# Patient Record
Sex: FEMALE | Race: WHITE | NOT HISPANIC OR LATINO | Employment: UNEMPLOYED | URBAN - METROPOLITAN AREA
[De-identification: names, ages, dates, MRNs, and addresses within clinical notes are randomized per-mention and may not be internally consistent; named-entity substitution may affect disease eponyms.]

---

## 2018-04-02 ENCOUNTER — TRANSCRIBE ORDERS (OUTPATIENT)
Dept: ADMINISTRATIVE | Facility: HOSPITAL | Age: 48
End: 2018-04-02

## 2018-04-02 DIAGNOSIS — Z12.31 VISIT FOR SCREENING MAMMOGRAM: Primary | ICD-10-CM

## 2018-05-02 ENCOUNTER — HOSPITAL ENCOUNTER (OUTPATIENT)
Dept: MAMMOGRAPHY | Facility: HOSPITAL | Age: 48
Discharge: HOME | End: 2018-05-02
Attending: OBSTETRICS & GYNECOLOGY | Admitting: OBSTETRICS & GYNECOLOGY

## 2018-05-02 ENCOUNTER — APPOINTMENT (OUTPATIENT)
Dept: OTHER | Facility: HOSPITAL | Age: 48
End: 2018-05-02
Attending: OBSTETRICS & GYNECOLOGY

## 2018-05-02 DIAGNOSIS — Z12.31 VISIT FOR SCREENING MAMMOGRAM: ICD-10-CM

## 2018-05-02 PROCEDURE — 77063 BREAST TOMOSYNTHESIS BI: CPT | Performed by: RADIOLOGY

## 2018-05-02 PROCEDURE — 77063 BREAST TOMOSYNTHESIS BI: CPT

## 2018-05-02 PROCEDURE — 77067 SCR MAMMO BI INCL CAD: CPT | Performed by: RADIOLOGY

## 2018-05-02 PROCEDURE — 77067 SCR MAMMO BI INCL CAD: CPT

## 2019-04-09 ENCOUNTER — TRANSCRIBE ORDERS (OUTPATIENT)
Dept: ADMINISTRATIVE | Facility: HOSPITAL | Age: 49
End: 2019-04-09

## 2019-04-09 DIAGNOSIS — Z12.31 VISIT FOR SCREENING MAMMOGRAM: Primary | ICD-10-CM

## 2019-05-08 ENCOUNTER — HOSPITAL ENCOUNTER (OUTPATIENT)
Dept: MAMMOGRAPHY | Facility: HOSPITAL | Age: 49
End: 2019-05-08

## 2019-06-17 ENCOUNTER — HOSPITAL ENCOUNTER (OUTPATIENT)
Dept: MAMMOGRAPHY | Facility: HOSPITAL | Age: 49
Discharge: HOME | End: 2019-06-17
Admitting: OBSTETRICS & GYNECOLOGY

## 2019-06-17 DIAGNOSIS — Z12.31 VISIT FOR SCREENING MAMMOGRAM: ICD-10-CM

## 2019-06-17 PROCEDURE — 77067 SCR MAMMO BI INCL CAD: CPT

## 2019-06-17 PROCEDURE — 77067 SCR MAMMO BI INCL CAD: CPT | Performed by: RADIOLOGY

## 2019-06-17 PROCEDURE — 77063 BREAST TOMOSYNTHESIS BI: CPT

## 2019-06-17 PROCEDURE — 77063 BREAST TOMOSYNTHESIS BI: CPT | Performed by: RADIOLOGY

## 2019-06-28 ENCOUNTER — HOSPITAL ENCOUNTER (OUTPATIENT)
Dept: ULTRASOUND IMAGING | Facility: HOSPITAL | Age: 49
Discharge: HOME | End: 2019-06-28

## 2019-06-28 ENCOUNTER — HOSPITAL ENCOUNTER (OUTPATIENT)
Dept: MAMMOGRAPHY | Facility: HOSPITAL | Age: 49
Discharge: HOME | End: 2019-06-28
Admitting: RADIOLOGY

## 2019-06-28 DIAGNOSIS — R92.8 ABNORMAL MAMMOGRAM: ICD-10-CM

## 2019-06-28 PROCEDURE — 77066 DX MAMMO INCL CAD BI: CPT | Performed by: RADIOLOGY

## 2019-06-28 PROCEDURE — 77062 BREAST TOMOSYNTHESIS BI: CPT | Performed by: RADIOLOGY

## 2019-06-28 PROCEDURE — 76642 ULTRASOUND BREAST LIMITED: CPT

## 2019-06-28 PROCEDURE — 76642 ULTRASOUND BREAST LIMITED: CPT | Performed by: RADIOLOGY

## 2019-06-28 PROCEDURE — G0279 TOMOSYNTHESIS, MAMMO: HCPCS

## 2019-06-28 PROCEDURE — 77066 DX MAMMO INCL CAD BI: CPT

## 2020-05-20 ENCOUNTER — TRANSCRIBE ORDERS (OUTPATIENT)
Dept: ADMINISTRATIVE | Facility: HOSPITAL | Age: 50
End: 2020-05-20

## 2020-05-20 DIAGNOSIS — Z12.31 VISIT FOR SCREENING MAMMOGRAM: Primary | ICD-10-CM

## 2020-07-09 ENCOUNTER — HOSPITAL ENCOUNTER (OUTPATIENT)
Dept: MAMMOGRAPHY | Facility: HOSPITAL | Age: 50
Discharge: HOME | End: 2020-07-09
Admitting: OBSTETRICS & GYNECOLOGY

## 2020-07-09 DIAGNOSIS — Z12.31 VISIT FOR SCREENING MAMMOGRAM: ICD-10-CM

## 2020-07-09 PROCEDURE — 77063 BREAST TOMOSYNTHESIS BI: CPT | Performed by: RADIOLOGY

## 2020-07-09 PROCEDURE — 77067 SCR MAMMO BI INCL CAD: CPT | Performed by: RADIOLOGY

## 2020-07-09 PROCEDURE — 77063 BREAST TOMOSYNTHESIS BI: CPT

## 2020-07-09 PROCEDURE — 77067 SCR MAMMO BI INCL CAD: CPT

## 2020-09-03 RX ORDER — TRANEXAMIC ACID 650 MG/1
1 TABLET ORAL 3 TIMES DAILY
Qty: 30 TABLET | Refills: 1 | Status: SHIPPED | OUTPATIENT
Start: 2020-09-03 | End: 2020-10-03

## 2021-05-24 ENCOUNTER — OFFICE VISIT (OUTPATIENT)
Dept: OBSTETRICS AND GYNECOLOGY | Facility: CLINIC | Age: 51
End: 2021-05-24

## 2021-05-24 VITALS
SYSTOLIC BLOOD PRESSURE: 118 MMHG | WEIGHT: 155.2 LBS | HEIGHT: 64 IN | DIASTOLIC BLOOD PRESSURE: 80 MMHG | BODY MASS INDEX: 26.5 KG/M2

## 2021-05-24 DIAGNOSIS — Z30.011 ENCOUNTER FOR ORAL CONTRACEPTION INITIAL PRESCRIPTION: ICD-10-CM

## 2021-05-24 DIAGNOSIS — D25.9 UTERINE LEIOMYOMA, UNSPECIFIED LOCATION: ICD-10-CM

## 2021-05-24 DIAGNOSIS — M54.50 LOW BACK PAIN, UNSPECIFIED BACK PAIN LATERALITY, UNSPECIFIED CHRONICITY, UNSPECIFIED WHETHER SCIATICA PRESENT: Primary | ICD-10-CM

## 2021-05-24 DIAGNOSIS — N94.6 DYSMENORRHEA: ICD-10-CM

## 2021-05-24 LAB
BILIRUB BLD-MCNC: NEGATIVE MG/DL
CLARITY, POC: CLEAR
COLOR UR: YELLOW
GLUCOSE UR STRIP-MCNC: NEGATIVE MG/DL
KETONES UR QL: NEGATIVE
LEUKOCYTE EST, POC: NEGATIVE
NITRITE UR-MCNC: NEGATIVE MG/ML
PH UR: 6.5 [PH] (ref 5–8)
PROT UR STRIP-MCNC: NEGATIVE MG/DL
RBC # UR STRIP: ABNORMAL /UL
SP GR UR: 1.01 (ref 1–1.03)
UROBILINOGEN UR QL: NORMAL

## 2021-05-24 PROCEDURE — 81003 URINALYSIS AUTO W/O SCOPE: CPT | Performed by: OBSTETRICS & GYNECOLOGY

## 2021-05-24 PROCEDURE — 99203 OFFICE O/P NEW LOW 30 MIN: CPT | Performed by: OBSTETRICS & GYNECOLOGY

## 2021-05-24 RX ORDER — HYDROCHLOROTHIAZIDE 25 MG/1
25 TABLET ORAL DAILY
COMMUNITY
Start: 2021-04-01

## 2021-05-24 RX ORDER — TRANEXAMIC ACID 650 MG/1
TABLET ORAL
Qty: 30 TABLET | Refills: 12 | Status: SHIPPED | OUTPATIENT
Start: 2021-05-24 | End: 2021-05-24 | Stop reason: ALTCHOICE

## 2021-05-24 RX ORDER — NORETHINDRONE ACETATE AND ETHINYL ESTRADIOL, ETHINYL ESTRADIOL AND FERROUS FUMARATE 1MG-10(24)
1 KIT ORAL DAILY
Qty: 84 TABLET | Refills: 0 | Status: SHIPPED | OUTPATIENT
Start: 2021-05-24 | End: 2021-09-13

## 2021-05-24 RX ORDER — DICYCLOMINE HCL 20 MG
TABLET ORAL
COMMUNITY
Start: 2021-05-20 | End: 2021-09-13

## 2021-05-24 NOTE — PROGRESS NOTES
Chief Complaint   Patient presents with   • Follow-up     painful period       Subjective   HPI  Aminta Mena is a 51 y.o. female, , who presents for very painful periods.      She states she has experienced this problem for 6 months.  She describes the severity as moderate and severe.  She states that the problem is worsening.  The patient reports additional symptoms as bloating, heavy bleeding. The patient uses 1 of tampons/pads per hour., intermenstrual bleeding and pelvic pain.      Her last LMP was Patient's last menstrual period was 2021..  Periods are regular every 21-30 days, lasting 4-5 days.  Dysmenorrhea:severe, occurring throughout menses.  Patient reports problems with: bloating, heavy bleeding. The patient uses 1 of tampons/pads per hour., intermenstrual bleeding and pelvic pain.  Partner Status: Marital Status: .  New Partners since last visit: no.  Desires STD Screening: no.    Patient reports she began having the bad periods about 6-7 months ago, she states she began having intermenstrual bleeding about correction through her cycle. This is spotting and it lasts one day.  This correlates with ovulation.  She states last night and today is the worst the pain has been and she states it is all across the front and the right side of her lower back. She asked to do a urinalysis to check for UTI.  She takes 400 mg motrin Q3 hours and this doesn't touch the pain.    Additional OB/GYN History   Current contraception: contraceptive methods: Vasectomy   Desires to: do not start contraception  Last Pap :   Last Completed Pap Smear     This patient has no relevant Health Maintenance data.        History of abnormal Pap smear: no  Last mammogram:   Last Completed Mammogram          MAMMOGRAM (Every 2 Years) Next due on 2020  Mammo Screening Digital Tomosynthesis Bilateral With CAD    2019  Mammo Diagnostic Digital Tomosynthesis Bilateral With CAD    2019   "Mammo Screening Digital Tomosynthesis Bilateral With CAD    2018  Mammo Screening Digital Tomosynthesis Bilateral With CAD              Tobacco Usage?: No   OB History        2    Para   2    Term   2            AB        Living   2       SAB        TAB        Ectopic        Molar        Multiple        Live Births   2                Health Maintenance   Topic Date Due   • Annual Gynecologic Pelvic and Breast Exam  Never done   • COLORECTAL CANCER SCREENING  Never done   • ANNUAL PHYSICAL  Never done   • COVID-19 Vaccine (1) Never done   • TDAP/TD VACCINES (1 - Tdap) Never done   • HEPATITIS C SCREENING  Never done   • ZOSTER VACCINE (1 of 2) Never done   • PAP SMEAR  2020   • INFLUENZA VACCINE  2021   • MAMMOGRAM  2022   • Pneumococcal Vaccine 0-64  Aged Out       The additional following portions of the patient's history were reviewed and updated as appropriate: allergies, current medications, past family history, past medical history, past social history and past surgical history.    Review of Systems   Constitutional: Negative.    HENT: Negative.    Eyes: Negative.    Respiratory: Negative.    Cardiovascular: Negative.    Gastrointestinal: Negative.    Endocrine: Negative.    Genitourinary: Positive for menstrual problem (intermenstrual bleeding) and pelvic pain.        Bloating   Musculoskeletal: Negative.    Skin: Negative.    Allergic/Immunologic: Negative.    Neurological: Negative.    Hematological: Negative.    Psychiatric/Behavioral: Negative.        I have reviewed and agree with the HPI, ROS, and historical information as entered above. Joelle Briscoe MD    Objective   /80   Ht 162.6 cm (64\")   Wt 70.4 kg (155 lb 3.2 oz)   LMP 2021   BMI 26.64 kg/m²     Physical Exam  Vitals and nursing note reviewed.   Constitutional:       General: She is not in acute distress.     Appearance: Normal appearance.   HENT:      Head: Normocephalic and atraumatic. "   Pulmonary:      Effort: Pulmonary effort is normal. No accessory muscle usage or respiratory distress.   Neurological:      Mental Status: She is alert and oriented to person, place, and time.   Psychiatric:         Mood and Affect: Mood and affect normal.         Speech: Speech normal.         Assessment/Plan     Assessment     Problem List Items Addressed This Visit        Genitourinary and Reproductive     Uterine fibroid      Other Visit Diagnoses     Low back pain, unspecified back pain laterality, unspecified chronicity, unspecified whether sciatica present    -  Primary    Relevant Orders    POC Urinalysis Dipstick, Automated (Completed)    Urine Culture - Urine, Urine, Clean Catch    Dysmenorrhea                Plan     Return for Annual physical.  1. The patient was counseled on risks of OCPs including increased risks for thromboembolic disease including dvt, pulmonary embolus, stroke and death.  We reviewed theoretical risks for breast cancer.  We also discussed risks for hypertension.  We reviewed that the risks are increased due to age being 35 or older and she desires to continue her OCPs.  The patient confirms she is a nonsmoker.  2. No lysteda while on OCPs  3. Will follow up for annual exam and to assess response of OCPs  4. If not improved will proceed with u/s.      Joelle Briscoe MD  05/24/2021

## 2021-05-25 RX ORDER — TRANEXAMIC ACID 650 MG/1
1 TABLET ORAL 3 TIMES DAILY
Qty: 30 TABLET | Refills: 1 | OUTPATIENT
Start: 2021-05-25 | End: 2021-06-24

## 2021-05-27 LAB
BACTERIA UR CULT: ABNORMAL
BACTERIA UR CULT: ABNORMAL
OTHER ANTIBIOTIC SUSC ISLT: ABNORMAL

## 2021-09-13 ENCOUNTER — OFFICE VISIT (OUTPATIENT)
Dept: OBSTETRICS AND GYNECOLOGY | Facility: CLINIC | Age: 51
End: 2021-09-13

## 2021-09-13 VITALS
HEIGHT: 64 IN | DIASTOLIC BLOOD PRESSURE: 78 MMHG | SYSTOLIC BLOOD PRESSURE: 120 MMHG | BODY MASS INDEX: 25.2 KG/M2 | WEIGHT: 147.6 LBS

## 2021-09-13 DIAGNOSIS — D25.1 INTRAMURAL LEIOMYOMA OF UTERUS: ICD-10-CM

## 2021-09-13 DIAGNOSIS — Z12.31 BREAST CANCER SCREENING BY MAMMOGRAM: ICD-10-CM

## 2021-09-13 DIAGNOSIS — N92.0 MENORRHAGIA WITH REGULAR CYCLE: ICD-10-CM

## 2021-09-13 DIAGNOSIS — Z01.419 ENCOUNTER FOR GYNECOLOGICAL EXAMINATION: Primary | ICD-10-CM

## 2021-09-13 PROCEDURE — 99396 PREV VISIT EST AGE 40-64: CPT | Performed by: OBSTETRICS & GYNECOLOGY

## 2021-09-13 RX ORDER — HYOSCYAMINE SULFATE EXTENDED-RELEASE 0.38 MG/1
TABLET ORAL
COMMUNITY
Start: 2021-08-29 | End: 2022-07-12

## 2021-09-13 RX ORDER — TRANEXAMIC ACID 650 MG/1
TABLET ORAL
COMMUNITY
Start: 2021-07-18 | End: 2021-09-13 | Stop reason: SDUPTHER

## 2021-09-13 RX ORDER — TRANEXAMIC ACID 650 MG/1
TABLET ORAL
Qty: 30 TABLET | Refills: 11 | Status: SHIPPED | OUTPATIENT
Start: 2021-09-13 | End: 2022-11-21

## 2021-09-13 NOTE — PROGRESS NOTES
Gynecologic Annual Exam Note      CC- Here for annual exam    Subjective     Aminta Mena is a 51 y.o. female established patient who presents for annual well woman exam. Patient's last menstrual period was 2021.. Her periods are regular every 28-30 days, lasting 4-6 days and are heavy and clots are present.  She reports moderate dysmenorrhea.    Partner Status: Marital Status: . New Partners since last visit: no.  Desires STD Screening: no.   Current contraception: vasectomy  The patient is happy with her current contraceptive method.      The patient does not have any complaints today.    She exercises regularly: no.  She has concerns about domestic violence: no.      OB History        2    Para   2    Term   2            AB        Living   2       SAB        TAB        Ectopic        Molar        Multiple        Live Births   2              Performs monthly Self-Breast Exam: no  History of abnormal Pap smear: no  Family history of uterine, colon or ovarian cancer: no  Family history of breast cancer: no  History of abnormal mammogram: no  Exercises Regularly: no  Feelings of Anxiety or Depression: no  Tobacco Usage?: No   Thromboembolic Disease: none      Last Mammogram:  Last Completed Mammogram          Ordered - MAMMOGRAM (Every 2 Years) Ordered on 2020  Mammo Screening Digital Tomosynthesis Bilateral With CAD    2019  Mammo Diagnostic Digital Tomosynthesis Bilateral With CAD    2019  Mammo Screening Digital Tomosynthesis Bilateral With CAD    2018  Mammo Screening Digital Tomosynthesis Bilateral With CAD                Last Colonoscopy: never  Pt states she did the cologuard last year and it was normal.      The following portions of the patient's history were reviewed and updated as appropriate: allergies, current medications, past family history, past medical history, past social history, past surgical history and problem  "list.    Past Medical History:   Diagnosis Date   • Anxiety    • Cough    • Depression    • Fatigue    • Hypertension    • Insomnia    • Irritable bowel syndrome    • Migraine headache     aura, vision changes   • Palpitations    • Pharyngitis    • Upper respiratory infection        Past Surgical History:   Procedure Laterality Date   • ABDOMINOPLASTY  2008   • CHOLECYSTECTOMY  05/2011   • KIDNEY STONE SURGERY  2009       Review of Systems  Review of Systems    Objective     /78 (BP Location: Left arm, Patient Position: Sitting, Cuff Size: Adult)   Ht 162.6 cm (64\")   Wt 67 kg (147 lb 9.6 oz)   LMP 08/23/2021   Breastfeeding No   BMI 25.34 kg/m²       Physical Exam  Vitals and nursing note reviewed. Exam conducted with a chaperone present.   Constitutional:       General: She is awake.   HENT:      Head: Normocephalic and atraumatic.   Neck:      Thyroid: No thyroid mass, thyromegaly or thyroid tenderness.   Cardiovascular:      Rate and Rhythm: Normal rate.      Heart sounds: No murmur heard.   No friction rub. No gallop.    Pulmonary:      Effort: Pulmonary effort is normal.      Breath sounds: Normal breath sounds. No stridor. No wheezing, rhonchi or rales.   Chest:      Chest wall: No mass or tenderness.      Breasts:         Right: Normal. No bleeding, inverted nipple, mass, nipple discharge, skin change or tenderness.         Left: Normal. No bleeding, inverted nipple, mass, nipple discharge, skin change or tenderness.   Abdominal:      Palpations: Abdomen is soft.      Tenderness: There is no guarding or rebound.      Hernia: There is no hernia in the left inguinal area or right inguinal area.   Genitourinary:     General: Normal vulva.      Pubic Area: No rash.       Labia:         Right: No rash, tenderness, lesion or injury.         Left: No rash, tenderness, lesion or injury.       Urethra: No prolapse, urethral swelling or urethral lesion.      Vagina: No foreign body. No vaginal discharge, " erythema, tenderness, bleeding or lesions.      Cervix: No cervical motion tenderness, discharge, friability, lesion, erythema or cervical bleeding.      Uterus: Normal. Not deviated, not enlarged, not fixed and not tender.       Adnexa: Right adnexa normal and left adnexa normal.        Right: No mass, tenderness or fullness.          Left: No mass, tenderness or fullness.        Rectum: No external hemorrhoid.   Musculoskeletal:      Cervical back: Normal range of motion.   Lymphadenopathy:      Upper Body:      Right upper body: No supraclavicular or axillary adenopathy.      Left upper body: No supraclavicular or axillary adenopathy.   Skin:     General: Skin is warm.   Neurological:      Mental Status: She is alert and oriented to person, place, and time.   Psychiatric:         Mood and Affect: Mood and affect normal.         Speech: Speech normal.         Assessment     Assessment     Problem List Items Addressed This Visit        Genitourinary and Reproductive     Uterine fibroid    Relevant Orders    US Non-ob Transvaginal      Other Visit Diagnoses     Encounter for gynecological examination    -  Primary    Relevant Orders    Pap IG, HPV-hr    Breast cancer screening by mammogram        Relevant Orders    Mammo Screening Digital Tomosynthesis Bilateral With CAD    Menorrhagia with regular cycle        Relevant Medications    Tranexamic Acid 650 MG tablet    Other Relevant Orders    US Non-ob Transvaginal            Plan     1. Reviewed monthly self breast exams.  Instructed to call with lumps, pain, or breast discharge.    2. Ordered Mammogram today  3. RTC in 1 year or PRN with problems.  4. Patient still with regular cycles that are heavy.  The Lysteda does help.  Repeat ultrasound performed today shows a normal sized uterus with a single fibroid measuring 1.3 x 1.1 x 1.0.  This is in the fundal location.  Bilateral ovaries were normal and there was no free fluid.  We discussed that she may continue the  Lysteda or begin an alternative therapy including progesterone only pills or Mirena IUD.  The IUD was discussed in detail.  She will consider these and if things are worsening she may return to the office for this.  5. The patient was counseled on types of intrauterine devices, insertion, and the typical bleeding patterns of these.  She understands that she may have spotting for 2-3 months after insertion and then may or may not have periods.  Occasionally, people may have more prolonged spotting.  Most people will have significantly shorter lighter periods with Kyleena or Mirena IUDs.  With Paraguard IUDs, periods may be heavier and cramping may become more intense.       Joelle Briscoe MD  09/13/2021

## 2021-09-13 NOTE — PATIENT INSTRUCTIONS
Breast Self-Awareness  Breast self-awareness is knowing how your breasts look and feel. Doing breast self-awareness is important. It allows you to catch a breast problem early while it is still small and can be treated. All women should do breast self-awareness, including women who have had breast implants. Tell your doctor if you notice a change in your breasts.  What you need:  · A mirror.  · A well-lit room.  How to do a breast self-exam  A breast self-exam is one way to learn what is normal for your breasts and to check for changes. To do a breast self-exam:  Look for changes    1. Take off all the clothes above your waist.  2.  front of a mirror in a room with good lighting.  3. Put your hands on your hips.  4. Push your hands down.  5. Look at your breasts and nipples in the mirror to see if one breast or nipple looks different from the other. Check to see if:  ? The shape of one breast is different.  ? The size of one breast is different.  ? There are wrinkles, dips, and bumps in one breast and not the other.  6. Look at each breast for changes in the skin, such as:  ? Redness.  ? Scaly areas.  7. Look for changes in your nipples, such as:  ? Liquid around the nipples.  ? Bleeding.  ? Dimpling.  ? Redness.  ? A change in where the nipples are.    Feel for changes    1. Lie on your back on the floor.  2. Feel each breast. To do this, follow these steps:  ? Pick a breast to feel.  ? Put the arm closest to that breast above your head.  ? Use your other arm to feel the nipple area of your breast. Feel the area with the pads of your three middle fingers by making small circles with your fingers. For the first [place], press lightly. For the second [place], press harder. For the third [place], press even harder.  ? Keep making circles with your fingers at the different pressures as you move down your breast. Stop when you feel your ribs.  ? Move your fingers a little toward the center of your body.  ? Start  making circles with your fingers again, this time going up until you reach your collarbone.  ? Keep making up-and-down circles until you reach your armpit. Remember to keep using the three pressures.  ? Feel the other breast in the same way.  3. Sit or  the tub or shower.  4. With soapy water on your skin, feel each breast the same way you did in step 2 when you were lying on the floor.    Write down what you find  Writing down what you find can help you remember what to tell your doctor. Write down:  · What is normal for each breast.  · Any changes you find in each breast, including:  ? The kind of changes you find.  ? Whether you have pain.  ? Size and location of any lumps.  · When you last had your menstrual period.  General tips  · Check your breasts every month.  · If you are breastfeeding, the best time to check your breasts is after you feed your baby or after you use a breast pump.  · If you get menstrual periods, the best time to check your breasts is 5-7 days after your menstrual period is over.  · With time, you will become comfortable with the self-exam, and you will begin to know if there are changes in your breasts.  Contact a doctor if you:  · See a change in the shape or size of your breasts or nipples.  · See a change in the skin of your breast or nipples, such as red or scaly skin.  · Have fluid coming from your nipples that is not normal.  · Find a lump or thick area that was not there before.  · Have pain in your breasts.  · Have any concerns about your breast health.  Summary  · Breast self-awareness includes looking for changes in your breasts, as well as feeling for changes within your breasts.  · Breast self-awareness should be done in front of a mirror in a well-lit room.  · You should check your breasts every month. If you get menstrual periods, the best time to check your breasts is 5-7 days after your menstrual period is over.  · Let your doctor know of any changes you see in  your breasts, including changes in size, changes on the skin, pain or tenderness, or fluid from your nipples that is not normal.  This information is not intended to replace advice given to you by your health care provider. Make sure you discuss any questions you have with your health care provider.  Document Revised: 08/06/2019 Document Reviewed: 08/06/2019  [employer] Patient Education © 2021 [employer]

## 2022-07-07 ENCOUNTER — HOSPITAL ENCOUNTER (OUTPATIENT)
Dept: MAMMOGRAPHY | Facility: HOSPITAL | Age: 52
Discharge: HOME | End: 2022-07-07
Admitting: OBSTETRICS & GYNECOLOGY

## 2022-07-07 DIAGNOSIS — Z12.31 BREAST CANCER SCREENING BY MAMMOGRAM: ICD-10-CM

## 2022-07-07 PROCEDURE — 77063 BREAST TOMOSYNTHESIS BI: CPT | Performed by: RADIOLOGY

## 2022-07-07 PROCEDURE — 77063 BREAST TOMOSYNTHESIS BI: CPT

## 2022-07-07 PROCEDURE — 77067 SCR MAMMO BI INCL CAD: CPT | Performed by: RADIOLOGY

## 2022-07-07 PROCEDURE — 77067 SCR MAMMO BI INCL CAD: CPT

## 2022-07-12 ENCOUNTER — OFFICE VISIT (OUTPATIENT)
Dept: OBSTETRICS AND GYNECOLOGY | Facility: CLINIC | Age: 52
End: 2022-07-12

## 2022-07-12 VITALS
HEIGHT: 64 IN | BODY MASS INDEX: 23.42 KG/M2 | SYSTOLIC BLOOD PRESSURE: 126 MMHG | WEIGHT: 137.2 LBS | DIASTOLIC BLOOD PRESSURE: 80 MMHG

## 2022-07-12 DIAGNOSIS — N95.1 PERIMENOPAUSAL SYMPTOMS: ICD-10-CM

## 2022-07-12 DIAGNOSIS — N92.6 IRREGULAR MENSES: Primary | ICD-10-CM

## 2022-07-12 PROCEDURE — 99212 OFFICE O/P EST SF 10 MIN: CPT | Performed by: NURSE PRACTITIONER

## 2022-07-12 RX ORDER — ACETAMINOPHEN AND CODEINE PHOSPHATE 120; 12 MG/5ML; MG/5ML
1 SOLUTION ORAL DAILY
Qty: 28 TABLET | Refills: 12 | Status: SHIPPED | OUTPATIENT
Start: 2022-07-12 | End: 2022-11-21

## 2022-07-12 NOTE — PROGRESS NOTES
Gynecologic Exam Note      Chief Complaint   Patient presents with   • perimenopausal symptoms       Subjective   HPI  Aminta Mena is a 52 y.o. female, , who presents for perimenopausal symptoms.  She has had recent lab work with PCP and she states potassium was low and she was started on potassium supplements. Periods are as follows since 2022. Had normal period  x 7 days, then Feb light spotting only for a couple of days. , spotting only, 22 has regular period x 7 days,  normal period x 5 days, then  and bleeding x 5 days and  spotting and cramping only. She is here to discuss perimenopause symptoms. She recently started black cohash OTC and Ambren for hot flashes and night sweats over the counter.     She states she has experienced this problem for 7 months. She states that the problem is worsening.  The patient reports additional symptoms as headaches, nausea, insomnia, hot flashes, moodiness, little appetite and irregular periods.      Patient does take Lysteda occasionally. Patient reports that it does help minimize the amount of bleeding, but doesn't help with her cramping.     Her last LMP was No LMP recorded. Patient is perimenopausal.  Periods are irregular, lasting 5-6 days.  Dysmenorrhea:severe, occurring first 1-2 days of flow.  Patient reports problems with: none.  Partner Status: Marital Status: .  New Partners since last visit: no.  Desires STD Screening: no.    Additional OB/GYN History   Current contraception: contraceptive methods: Vasectomy   Desires to: do not start contraception - unless she needs to.   Last Pap :   Last Completed Pap Smear          PAP SMEAR (Yearly) Next due on 2021  Pap IG, HPV-hr    2019  Done              History of abnormal Pap smear: no  Last mammogram:   Last Completed Mammogram          MAMMOGRAM (Every 2 Years) Next due on 2022  Mammo Screening Digital  "Tomosynthesis Bilateral With CAD    2020  Mammo Screening Digital Tomosynthesis Bilateral With CAD    2019  Mammo Diagnostic Digital Tomosynthesis Bilateral With CAD    2019  Mammo Screening Digital Tomosynthesis Bilateral With CAD    2018  Mammo Screening Digital Tomosynthesis Bilateral With CAD              Tobacco Usage?: No   OB History        2    Para   2    Term   2            AB        Living   2       SAB        IAB        Ectopic        Molar        Multiple        Live Births   2                Health Maintenance   Topic Date Due   • COLORECTAL CANCER SCREENING  Never done   • COVID-19 Vaccine (1) Never done   • ANNUAL PHYSICAL  Never done   • TDAP/TD VACCINES (1 - Tdap) Never done   • HEPATITIS C SCREENING  Never done   • ZOSTER VACCINE (1 of 2) Never done   • LIPID PANEL  2022   • Annual Gynecologic Pelvic and Breast Exam  2022   • PAP SMEAR  2022   • INFLUENZA VACCINE  10/01/2022   • MAMMOGRAM  2024   • Pneumococcal Vaccine 0-64  Aged Out       The additional following portions of the patient's history were reviewed and updated as appropriate: allergies and current medications.    Review of Systems   Constitutional: Negative.    Cardiovascular: Negative.    Gastrointestinal: Positive for nausea.   Endocrine: Positive for heat intolerance (hot flashes).   Genitourinary: Positive for menstrual problem (irregular menses).   Neurological: Positive for headache.   Psychiatric/Behavioral: Positive for sleep disturbance.       I have reviewed and agree with the HPI, ROS, and historical information as entered above. Shantelle Mayorga, CUBA    Objective   /80 (BP Location: Left arm, Patient Position: Sitting, Cuff Size: Adult)   Ht 162.6 cm (64\")   Wt 62.2 kg (137 lb 3.2 oz)   BMI 23.55 kg/m²     Physical Exam  Vitals and nursing note reviewed.   Constitutional:       Appearance: Normal appearance. She is normal weight.   Neurological:      " Mental Status: She is alert.         Assessment & Plan     Assessment     Problem List Items Addressed This Visit    None     Visit Diagnoses     Irregular menses    -  Primary    Relevant Medications    norethindrone (MICRONOR) 0.35 MG tablet    Perimenopausal symptoms                Plan     Perimenopausal symptoms reviewed. She is going to start progestin only pills when next period starts.   2.   Will keep track of periods for now      Shantelle Mayorga, CUBA  07/12/2022

## 2022-11-21 ENCOUNTER — OFFICE VISIT (OUTPATIENT)
Dept: OBSTETRICS AND GYNECOLOGY | Facility: CLINIC | Age: 52
End: 2022-11-21

## 2022-11-21 VITALS
DIASTOLIC BLOOD PRESSURE: 90 MMHG | HEIGHT: 64 IN | WEIGHT: 132.8 LBS | SYSTOLIC BLOOD PRESSURE: 134 MMHG | BODY MASS INDEX: 22.67 KG/M2

## 2022-11-21 DIAGNOSIS — N81.4 UTERINE PROLAPSE: ICD-10-CM

## 2022-11-21 DIAGNOSIS — Z12.31 BREAST CANCER SCREENING BY MAMMOGRAM: ICD-10-CM

## 2022-11-21 DIAGNOSIS — R87.610 PAP SMEAR ABNORMALITY OF CERVIX WITH ASCUS FAVORING BENIGN: ICD-10-CM

## 2022-11-21 DIAGNOSIS — Z01.419 ENCOUNTER FOR GYNECOLOGICAL EXAMINATION WITHOUT ABNORMAL FINDING: Primary | ICD-10-CM

## 2022-11-21 PROCEDURE — 99396 PREV VISIT EST AGE 40-64: CPT | Performed by: OBSTETRICS & GYNECOLOGY

## 2022-11-21 RX ORDER — POTASSIUM CHLORIDE 1500 MG/1
20 TABLET, FILM COATED, EXTENDED RELEASE ORAL 2 TIMES DAILY WITH MEALS
COMMUNITY
Start: 2022-10-10

## 2022-11-21 RX ORDER — ONDANSETRON HYDROCHLORIDE 8 MG/1
8 TABLET, FILM COATED ORAL EVERY 6 HOURS PRN
COMMUNITY
Start: 2022-10-24

## 2022-11-21 NOTE — PROGRESS NOTES
Gynecologic Annual Exam Note          CC - Here for annual exam.     Subjective     HPI  Aminta Mena is a 52 y.o. female, , who presents for annual well woman exam. She is perimenopausal. Patient's last menstrual period was 2022 (exact date). Periods are irregular. Patient reports she did have some spotting in September and some spotting today. Dysmenorrhea: occasional. Patient reports that when she does have cramping, it is pretty minimal. Patient started taking Black Cohosh and Amberen in July for hot flashes and night sweats. Patient reports taking the Black Cohash for about a month, and didn't tolerate it - frequent headaches. Patient reports taking the Ambren for about 3 months, and read that you shouldn't take it longer than that, so she stopped. Patient reports she has not had any hot flashes or night sweats since that time. Patient reports problems with: trouble sleeping. Patient reports she was put on Temazepam and Klonopin to help and reports neither of those worked for her.     Patient was placed on Micronor (POP) at her last appt in July and was told to start with her next period to help with periomenopausal symptoms. Patient reports she never started her period, so she never took it.     Partner Status: Marital Status: . New Partners since last visit: no.  The patient Denies vasomotor symptoms.     She exercises regularly: no.  She has concerns about domestic violence: no.      Additional OB/GYN History   Current contraception: contraceptive methods: Vasectomy   Desires to: do not start contraception  History of abnormal Pap smear: no  Family history of uterine, colon, breast, or ovarian cancer: no  Performs monthly Self-Breast Exam: no  Feelings of Anxiety or Depression: yes - Managed somewhat per patient. Patient lost her mother 2 months ago    Last Pap : 2021 ASCUS HPV Negative   Last Completed Pap Smear     This patient has no relevant Health Maintenance data.           Last mammogram: 2022 At [facility]   No findings suspicious for malignancy.   Last Completed Mammogram          Ordered - MAMMOGRAM (Every 2 Years) Ordered on 2022  Mammo Screening Digital Tomosynthesis Bilateral With CAD    2020  Mammo Screening Digital Tomosynthesis Bilateral With CAD    2019  Mammo Diagnostic Digital Tomosynthesis Bilateral With CAD    2019  Mammo Screening Digital Tomosynthesis Bilateral With CAD    2018  Mammo Screening Digital Tomosynthesis Bilateral With CAD                Last colonoscopy: Cologard ~ (Normal per patient.)   Last Completed Colonoscopy          COLORECTAL CANCER SCREENING (COLOGUARD - Every 3 Years) Next due on 2021  COLOGUARD (Done)                  Tobacco Usage?: No   OB History        2    Para   2    Term   2       0    AB   0    Living   2       SAB   0    IAB   0    Ectopic   0    Molar   0    Multiple   0    Live Births   2                  The additional following portions of the patient's history were reviewed and updated as appropriate: allergies, current medications, past family history, past medical history, past social history and past surgical history.    Past Medical History:   Diagnosis Date   • Anxiety    • Cough    • Depression    • Fatigue    • Fibroid    • Hyperlipidemia    • Hypertension    • Insomnia    • Irritable bowel syndrome    • Kidney stone    • Migraine headache     aura, vision changes   • Palpitations    • Pharyngitis    • PMS (premenstrual syndrome)    • Upper respiratory infection    • Urinary tract infection    • Varicella         Past Surgical History:   Procedure Laterality Date   • ABDOMINOPLASTY     • CHOLECYSTECTOMY  2011   • KIDNEY STONE SURGERY     • WISDOM TOOTH EXTRACTION          Review of Systems    I have reviewed and agree with the HPI, ROS, and historical information as entered above. Joelle Briscoe MD    Objective   /90  "(BP Location: Left arm, Patient Position: Sitting, Cuff Size: Adult)   Ht 162.6 cm (64\")   Wt 60.2 kg (132 lb 12.8 oz)   LMP 06/13/2022 (Exact Date)   BMI 22.80 kg/m²     Physical Exam  Vitals and nursing note reviewed. Exam conducted with a chaperone present.   Constitutional:       General: She is awake.   HENT:      Head: Normocephalic and atraumatic.   Neck:      Thyroid: No thyroid mass, thyromegaly or thyroid tenderness.   Cardiovascular:      Rate and Rhythm: Normal rate.      Heart sounds: No murmur heard.    No friction rub. No gallop.   Pulmonary:      Effort: Pulmonary effort is normal.      Breath sounds: Normal breath sounds. No stridor. No wheezing, rhonchi or rales.   Chest:      Chest wall: No mass or tenderness.   Breasts:     Right: Normal. No bleeding, inverted nipple, mass, nipple discharge, skin change or tenderness.      Left: Normal. No bleeding, inverted nipple, mass, nipple discharge, skin change or tenderness.   Abdominal:      Palpations: Abdomen is soft.      Tenderness: There is no guarding or rebound.      Hernia: There is no hernia in the left inguinal area or right inguinal area.   Genitourinary:     General: Normal vulva.      Pubic Area: No rash.       Labia:         Right: No rash, tenderness, lesion or injury.         Left: No rash, tenderness, lesion or injury.       Urethra: No prolapse, urethral swelling or urethral lesion.      Vagina: No foreign body. No vaginal discharge, erythema, tenderness, bleeding or lesions.      Cervix: No cervical motion tenderness, discharge, friability, lesion, erythema or cervical bleeding.      Uterus: Normal. With uterine prolapse (grade 1-2). Not deviated, not enlarged, not fixed and not tender.       Adnexa: Right adnexa normal and left adnexa normal.        Right: No mass, tenderness or fullness.          Left: No mass, tenderness or fullness.        Rectum: No external hemorrhoid.   Musculoskeletal:      Cervical back: Normal range of " motion.   Lymphadenopathy:      Upper Body:      Right upper body: No supraclavicular or axillary adenopathy.      Left upper body: No supraclavicular or axillary adenopathy.   Skin:     General: Skin is warm.   Neurological:      Mental Status: She is alert and oriented to person, place, and time.   Psychiatric:         Mood and Affect: Mood and affect normal.         Speech: Speech normal.         Assessment & Plan     Assessment     Problem List Items Addressed This Visit    None  Visit Diagnoses     Encounter for gynecological examination without abnormal finding    -  Primary    Pap smear abnormality of cervix with ASCUS favoring benign        Relevant Orders    LIQUID-BASED PAP SMEAR, [facility] ([facility])    Breast cancer screening by mammogram        Relevant Orders    Mammo Screening Digital Tomosynthesis Bilateral With CAD    Uterine prolapse              Plan     1. Reviewed monthly self breast exams.  Instructed to call with lumps, pain, or breast discharge.  Yearly mammograms ordered.  2. Ordered mammogram today.  3. Instructed on Kegal exercises and gave patient educational information.  4. RTC in 1 year or PRN with problems.  5. Patient starting to notice prolapse vaginally.  This is asymptomatic.  She does have some mild uterine prolapse.  We discussed treatment options and referral to physical therapy.  At this time as she is symptomatic, she declines referral.  She will call us back if her symptoms worsen.    Joelle Briscoe MD  11/21/2022

## 2022-11-23 LAB — REF LAB TEST METHOD: NORMAL

## 2022-11-28 DIAGNOSIS — N92.0 MENORRHAGIA WITH REGULAR CYCLE: ICD-10-CM

## 2022-11-28 DIAGNOSIS — Z30.011 ENCOUNTER FOR ORAL CONTRACEPTION INITIAL PRESCRIPTION: Primary | ICD-10-CM

## 2022-11-28 RX ORDER — ACETAMINOPHEN AND CODEINE PHOSPHATE 120; 12 MG/5ML; MG/5ML
1 SOLUTION ORAL DAILY
Qty: 28 TABLET | Refills: 12 | Status: SHIPPED | OUTPATIENT
Start: 2022-11-28 | End: 2023-11-28

## 2022-11-28 RX ORDER — TRANEXAMIC ACID 650 MG/1
TABLET ORAL
Qty: 30 TABLET | Refills: 12 | Status: SHIPPED | OUTPATIENT
Start: 2022-11-28

## 2023-12-22 ENCOUNTER — TELEPHONE (OUTPATIENT)
Dept: OBSTETRICS AND GYNECOLOGY | Age: 53
End: 2023-12-22

## 2024-04-11 ENCOUNTER — PATIENT ROUNDING (BHMG ONLY) (OUTPATIENT)
Dept: OBSTETRICS AND GYNECOLOGY | Age: 54
End: 2024-04-11
Payer: COMMERCIAL

## 2024-04-11 ENCOUNTER — OFFICE VISIT (OUTPATIENT)
Dept: OBSTETRICS AND GYNECOLOGY | Age: 54
End: 2024-04-11
Payer: COMMERCIAL

## 2024-04-11 VITALS
BODY MASS INDEX: 24.41 KG/M2 | SYSTOLIC BLOOD PRESSURE: 138 MMHG | HEIGHT: 64 IN | WEIGHT: 143 LBS | DIASTOLIC BLOOD PRESSURE: 84 MMHG

## 2024-04-11 DIAGNOSIS — N39.41 URGE INCONTINENCE: ICD-10-CM

## 2024-04-11 DIAGNOSIS — N81.10 CYSTOCELE WITH RECTOCELE: ICD-10-CM

## 2024-04-11 DIAGNOSIS — Z12.31 SCREENING MAMMOGRAM FOR BREAST CANCER: ICD-10-CM

## 2024-04-11 DIAGNOSIS — N39.3 STRESS INCONTINENCE OF URINE: ICD-10-CM

## 2024-04-11 DIAGNOSIS — N81.6 CYSTOCELE WITH RECTOCELE: ICD-10-CM

## 2024-04-11 DIAGNOSIS — Z11.51 SCREENING FOR HUMAN PAPILLOMAVIRUS (HPV): ICD-10-CM

## 2024-04-11 DIAGNOSIS — N81.3 UTEROVAGINAL PROLAPSE, COMPLETE: ICD-10-CM

## 2024-04-11 DIAGNOSIS — Z13.89 SCREENING FOR BLOOD OR PROTEIN IN URINE: ICD-10-CM

## 2024-04-11 DIAGNOSIS — Z01.419 WELL FEMALE EXAM WITH ROUTINE GYNECOLOGICAL EXAM: Primary | ICD-10-CM

## 2024-04-11 DIAGNOSIS — Z12.4 SCREENING FOR MALIGNANT NEOPLASM OF CERVIX: ICD-10-CM

## 2024-04-11 LAB
BILIRUB BLD-MCNC: ABNORMAL MG/DL
CLARITY, POC: CLEAR
COLOR UR: YELLOW
GLUCOSE UR STRIP-MCNC: NEGATIVE MG/DL
KETONES UR QL: ABNORMAL
LEUKOCYTE EST, POC: ABNORMAL
NITRITE UR-MCNC: NEGATIVE MG/ML
PH UR: 6 [PH] (ref 5–8)
PROT UR STRIP-MCNC: ABNORMAL MG/DL
RBC # UR STRIP: ABNORMAL /UL
SP GR UR: 1.02 (ref 1–1.03)
UROBILINOGEN UR QL: ABNORMAL

## 2024-04-11 RX ORDER — DICYCLOMINE HCL 20 MG
20 TABLET ORAL
COMMUNITY
Start: 2024-02-23

## 2024-04-11 NOTE — PROGRESS NOTES
[facility]   Obstetrics and Gynecology     2024    Patient: Aminta Mena          MR#:[id number]    History of Present Illness    Chief Complaint   Patient presents with    Gynecologic Exam     CC: New Annual,  last pap 21 ASCUS, HPV neg, last mammo 21 neg       54 y.o. female  who presents for annual exam.    The patient reports generally feeling well with some difficulty with worsening uterine prolapse and some difficulty with constipation and defecating.  The patient does have symptoms of urinary incontinence as well.      Relevant data reviewed:  US Non-ob Transvaginal (2021 10:14)     No LMP recorded. Patient is perimenopausal.  Obstetric History:  OB History          2    Para   2    Term   2       0    AB   0    Living   2         SAB   0    IAB   0    Ectopic   0    Molar   0    Multiple   0    Live Births   2               Menstrual History:     No LMP recorded. Patient is perimenopausal.       Social History     Substance and Sexual Activity   Sexual Activity Yes    Partners: Male    Birth control/protection: Vasectomy     ______________________________________  Patient Active Problem List   Diagnosis    Uterine fibroid    Migraine headache     Past Medical History:   Diagnosis Date    Anxiety     Depression     Fibroid     Hyperlipidemia     Hypertension     Insomnia     Irritable bowel syndrome     Kidney stone     Migraine headache     aura, vision changes    Palpitations     PMS (premenstrual syndrome)     Varicella      Past Surgical History:   Procedure Laterality Date    ABDOMINOPLASTY      CHOLECYSTECTOMY  2011    KIDNEY STONE SURGERY  2009    WISDOM TOOTH EXTRACTION       Social History     Tobacco Use   Smoking Status Never    Passive exposure: Never   Smokeless Tobacco Never     Family History   Problem Relation Age of Onset    Heart attack Father     Hyperlipidemia Father     Hypertension Father     Other Mother          elevated blood protien    Lymphoma Mother     Breast cancer Neg Hx     Ovarian cancer Neg Hx     Uterine cancer Neg Hx     Colon cancer Neg Hx      Prior to Admission medications    Medication Sig Start Date End Date Taking? Authorizing Provider   dicyclomine (BENTYL) 20 MG tablet 1 tablet. 2/23/24  Yes Anthony Foster MD   furosemide (LASIX) 20 MG tablet Take 1 tablet by mouth Daily. 7/29/13  Yes Dionicio Medrano MD   hydroCHLOROthiazide (HYDRODIURIL) 25 MG tablet Take 1 tablet by mouth Daily. 4/1/21  Yes Anthony Foster MD   LORazepam (ATIVAN) 1 MG tablet Take 0.5 tablets by mouth 2 (Two) Times a Day As Needed. 7/19/13  Yes Dionicio Medrano MD   ondansetron (ZOFRAN) 8 MG tablet Take 1 tablet by mouth Every 6 (Six) Hours As Needed. for nausea 10/24/22  Yes Anthony Foster MD   potassium chloride ER (K-TAB) 20 MEQ tablet controlled-release ER tablet Take 1 tablet by mouth 2 (Two) Times a Day With Meals. 10/10/22  Yes Anthony Foster MD   SUMAtriptan (IMITREX) 100 MG tablet TAKE 1 TABLET AT ONSET OF MIGRAINE, MAY REPEAT IN 2 HRS. IF NEEDED 7/15/16  Yes Dionicio Medrano MD   norethindrone (MICRONOR) 0.35 MG tablet Take 1 tablet by mouth Daily. 11/28/22 11/28/23  Joelle Briscoe MD   Tranexamic Acid (Lysteda) 650 MG tablet Take 2 tablets by mouth 3 times daily for up to 5 days only while actively bleeding 11/28/22   Joelle Briscoe MD     _______________________________________    Current contraception: vasectomy  History of abnormal Pap smear: no  Family history of uterine or ovarian cancer: no  Family History of colon cancer/colon polyps: no  History of abnormal mammogram: no  History of abnormal lipids: no    The following portions of the patient's history were reviewed and updated as appropriate: allergies, current medications, past family history, past medical history, past social history, past surgical history, and problem list.    Review of Systems   Constitutional: Negative.    Respiratory:  "Negative.     Cardiovascular: Negative.    Gastrointestinal:  Positive for constipation.   Genitourinary:  Positive for difficulty urinating.   Psychiatric/Behavioral: Negative.         Pertinent items are noted in HPI.       Objective   Physical Exam    /84   Ht 162.6 cm (64\")   Wt 64.9 kg (143 lb)   BMI 24.55 kg/m²    BP Readings from Last 3 Encounters:   24 138/84   22 134/90   22 126/80      Wt Readings from Last 3 Encounters:   24 64.9 kg (143 lb)   22 60.2 kg (132 lb 12.8 oz)   22 62.2 kg (137 lb 3.2 oz)        BMI: Estimated body mass index is 24.55 kg/m² as calculated from the following:    Height as of this encounter: 162.6 cm (64\").    Weight as of this encounter: 64.9 kg (143 lb).       General: alert, appears stated age, and cooperative   Heart: regular rate and rhythm, S1, S2 normal, no murmur, click, rub or gallop   Lungs: clear to auscultation bilaterally   Abdomen: soft, non-tender, without masses, no organomegaly   Breast: inspection negative, no nipple discharge or bleeding, no masses or nodularity palpable   External genitalia/Vulva: External genitalia including bartholin's glands, Urethra, Summerhill's gland and urethra meatus are normal, Perineum, rectum and anus appear normal , and Bladder appears normal without significant prolapse    Vagina: normal mucosa, normal discharge, cystocele, moderate rectocele, grade 3 uterine prolapse   Cervix: no lesions   Uterus: normal size and non-tender   Adnexa: normal adnexa     As part of wellness and prevention, the following topics were discussed with the patient:  Encouraged self breast exam  Physical activity and regular exercised encouraged.         Problem List   Meds  History  Prep for Surg   Imagin}    Assessment:  Diagnoses and all orders for this visit:    1. Well female exam with routine gynecological exam (Primary)  -     IGP, Apt HPV,rfx 16 / 18,45    2. Screening for human papillomavirus (HPV)  - "     IGP, Apt HPV,rfx 16 / 18,45    3. Screening for malignant neoplasm of cervix  -     IGP, Apt HPV,rfx 16 / 18,45    4. Screening mammogram for breast cancer  -     Mammo Screening Digital Tomosynthesis Bilateral With CAD; Future    5. Screening for blood or protein in urine  -     POC Urinalysis Dipstick    6. Cystocele with rectocele  -     Ambulatory Referral to Gynecologic Urology    7. Uterovaginal prolapse, complete  -     Ambulatory Referral to Gynecologic Urology    8. Urge incontinence    9. Stress incontinence of urine  -     Ambulatory Referral to Gynecologic Urology      Plan:  Return in 1 year (on 4/11/2025) for Annual exam.    Magnus Leach MD  4/11/2024 10:21 EDT

## 2024-04-11 NOTE — PROGRESS NOTES
A MY CHART MESSAGE HAS BEEN SENT TO THE PATIENT FOR [facility].

## 2024-04-12 ENCOUNTER — TELEPHONE (OUTPATIENT)
Dept: OBSTETRICS AND GYNECOLOGY | Age: 54
End: 2024-04-12
Payer: COMMERCIAL

## 2024-04-12 NOTE — TELEPHONE ENCOUNTER
IF PT CALLED BACK PLEASE TRANSFER HER TO [facility] AT EXT [phone]. LVM FOR PT TO CB TO LET HER KNOW APT INFO. FILL OUT ALL PAPERWORK AND TAKE WITH HER AS WELL AS ID INS. PT ALSO HAS MAMMO AT 1030 THAT DAY AND I THINK SHE SHOULD BE ABLE TO GET ALL DONE, BUT IF SHE WANTS TO RESCHEDULE MAMMO I WILL.

## 2024-04-12 NOTE — TELEPHONE ENCOUNTER
Pt was asking about her urine test from yesterday 4/11/24. It shows some deviations and she didn't know if it is something to be concerned about. Can you please advise?

## 2024-04-15 LAB
CYTOLOGIST CVX/VAG CYTO: NORMAL
CYTOLOGY CVX/VAG DOC CYTO: NORMAL
CYTOLOGY CVX/VAG DOC THIN PREP: NORMAL
DX ICD CODE: NORMAL
HPV I/H RISK 4 DNA CVX QL PROBE+SIG AMP: NEGATIVE
Lab: NORMAL
OTHER STN SPEC: NORMAL
STAT OF ADQ CVX/VAG CYTO-IMP: NORMAL

## 2024-04-15 NOTE — PROGRESS NOTES
Call pt:  PAP is negative.     There is inflammation noted likely from yeast.  Rx sent to the pharmacy